# Patient Record
Sex: MALE | Race: WHITE | Employment: UNEMPLOYED | URBAN - METROPOLITAN AREA
[De-identification: names, ages, dates, MRNs, and addresses within clinical notes are randomized per-mention and may not be internally consistent; named-entity substitution may affect disease eponyms.]

---

## 2020-04-25 ENCOUNTER — HOSPITAL ENCOUNTER (EMERGENCY)
Age: 35
Discharge: ANOTHER HOSPITAL | End: 2020-04-26
Attending: EMERGENCY MEDICINE
Payer: MEDICAID

## 2020-04-25 ENCOUNTER — APPOINTMENT (OUTPATIENT)
Dept: GENERAL RADIOLOGY | Age: 35
End: 2020-04-25
Attending: EMERGENCY MEDICINE
Payer: MEDICAID

## 2020-04-25 ENCOUNTER — APPOINTMENT (OUTPATIENT)
Dept: CT IMAGING | Age: 35
End: 2020-04-25
Attending: EMERGENCY MEDICINE
Payer: MEDICAID

## 2020-04-25 DIAGNOSIS — K12.2 ANGINA, LUDWIG: Primary | ICD-10-CM

## 2020-04-25 LAB
ANION GAP SERPL CALC-SCNC: 9 MMOL/L (ref 3–18)
BASOPHILS # BLD: 0 K/UL (ref 0–0.1)
BASOPHILS NFR BLD: 0 % (ref 0–2)
BUN SERPL-MCNC: 13 MG/DL (ref 7–18)
BUN/CREAT SERPL: 9 (ref 12–20)
CALCIUM SERPL-MCNC: 9.8 MG/DL (ref 8.5–10.1)
CHLORIDE SERPL-SCNC: 101 MMOL/L (ref 100–111)
CO2 SERPL-SCNC: 25 MMOL/L (ref 21–32)
CREAT SERPL-MCNC: 1.4 MG/DL (ref 0.6–1.3)
DEPRECATED S PYO AG THROAT QL EIA: NEGATIVE
DIFFERENTIAL METHOD BLD: ABNORMAL
EOSINOPHIL # BLD: 0 K/UL (ref 0–0.4)
EOSINOPHIL NFR BLD: 0 % (ref 0–5)
ERYTHROCYTE [DISTWIDTH] IN BLOOD BY AUTOMATED COUNT: 12.8 % (ref 11.6–14.5)
GLUCOSE SERPL-MCNC: 194 MG/DL (ref 74–99)
HCT VFR BLD AUTO: 46.2 % (ref 36–48)
HGB BLD-MCNC: 15.6 G/DL (ref 13–16)
LACTATE SERPL-SCNC: 1.8 MMOL/L (ref 0.4–2)
LYMPHOCYTES # BLD: 0.9 K/UL (ref 0.9–3.6)
LYMPHOCYTES NFR BLD: 4 % (ref 21–52)
MCH RBC QN AUTO: 32.8 PG (ref 24–34)
MCHC RBC AUTO-ENTMCNC: 33.8 G/DL (ref 31–37)
MCV RBC AUTO: 97.3 FL (ref 74–97)
MONOCYTES # BLD: 1.4 K/UL (ref 0.05–1.2)
MONOCYTES NFR BLD: 6 % (ref 3–10)
NEUTS SEG # BLD: 20.4 K/UL (ref 1.8–8)
NEUTS SEG NFR BLD: 90 % (ref 40–73)
PLATELET # BLD AUTO: 279 K/UL (ref 135–420)
PMV BLD AUTO: 9.8 FL (ref 9.2–11.8)
POTASSIUM SERPL-SCNC: 3.3 MMOL/L (ref 3.5–5.5)
RBC # BLD AUTO: 4.75 M/UL (ref 4.7–5.5)
SODIUM SERPL-SCNC: 135 MMOL/L (ref 136–145)
WBC # BLD AUTO: 22.7 K/UL (ref 4.6–13.2)

## 2020-04-25 PROCEDURE — 83605 ASSAY OF LACTIC ACID: CPT

## 2020-04-25 PROCEDURE — 87070 CULTURE OTHR SPECIMN AEROBIC: CPT

## 2020-04-25 PROCEDURE — 87040 BLOOD CULTURE FOR BACTERIA: CPT

## 2020-04-25 PROCEDURE — 51702 INSERT TEMP BLADDER CATH: CPT

## 2020-04-25 PROCEDURE — 96375 TX/PRO/DX INJ NEW DRUG ADDON: CPT

## 2020-04-25 PROCEDURE — 74011250636 HC RX REV CODE- 250/636: Performed by: EMERGENCY MEDICINE

## 2020-04-25 PROCEDURE — 85025 COMPLETE CBC W/AUTO DIFF WBC: CPT

## 2020-04-25 PROCEDURE — 74011000258 HC RX REV CODE- 258: Performed by: EMERGENCY MEDICINE

## 2020-04-25 PROCEDURE — 80048 BASIC METABOLIC PNL TOTAL CA: CPT

## 2020-04-25 PROCEDURE — 70491 CT SOFT TISSUE NECK W/DYE: CPT

## 2020-04-25 PROCEDURE — 96361 HYDRATE IV INFUSION ADD-ON: CPT

## 2020-04-25 PROCEDURE — 74011000250 HC RX REV CODE- 250: Performed by: EMERGENCY MEDICINE

## 2020-04-25 PROCEDURE — 74011636320 HC RX REV CODE- 636/320: Performed by: EMERGENCY MEDICINE

## 2020-04-25 PROCEDURE — 99291 CRITICAL CARE FIRST HOUR: CPT

## 2020-04-25 PROCEDURE — 96365 THER/PROPH/DIAG IV INF INIT: CPT

## 2020-04-25 PROCEDURE — 99285 EMERGENCY DEPT VISIT HI MDM: CPT

## 2020-04-25 PROCEDURE — 74011250637 HC RX REV CODE- 250/637: Performed by: EMERGENCY MEDICINE

## 2020-04-25 PROCEDURE — 87880 STREP A ASSAY W/OPTIC: CPT

## 2020-04-25 PROCEDURE — 77030005529 HC CATH URETH FOL40 BARD -A

## 2020-04-25 RX ORDER — CLINDAMYCIN PHOSPHATE 600 MG/50ML
600 INJECTION INTRAVENOUS
Status: DISCONTINUED | OUTPATIENT
Start: 2020-04-25 | End: 2020-04-25 | Stop reason: RX

## 2020-04-25 RX ORDER — FENTANYL CITRATE 50 UG/ML
50 INJECTION, SOLUTION INTRAMUSCULAR; INTRAVENOUS ONCE
Status: COMPLETED | OUTPATIENT
Start: 2020-04-25 | End: 2020-04-25

## 2020-04-25 RX ORDER — DIPHENHYDRAMINE HYDROCHLORIDE 50 MG/ML
12.5 INJECTION, SOLUTION INTRAMUSCULAR; INTRAVENOUS ONCE
Status: COMPLETED | OUTPATIENT
Start: 2020-04-25 | End: 2020-04-25

## 2020-04-25 RX ORDER — MORPHINE SULFATE 4 MG/ML
4 INJECTION, SOLUTION INTRAMUSCULAR; INTRAVENOUS
Status: COMPLETED | OUTPATIENT
Start: 2020-04-25 | End: 2020-04-25

## 2020-04-25 RX ORDER — ONDANSETRON 4 MG/1
4 TABLET, ORALLY DISINTEGRATING ORAL
Status: COMPLETED | OUTPATIENT
Start: 2020-04-25 | End: 2020-04-25

## 2020-04-25 RX ADMIN — MORPHINE SULFATE 4 MG: 4 INJECTION, SOLUTION INTRAMUSCULAR; INTRAVENOUS at 23:26

## 2020-04-25 RX ADMIN — SODIUM CHLORIDE 600 MG: 900 INJECTION, SOLUTION INTRAVENOUS at 23:01

## 2020-04-25 RX ADMIN — DIPHENHYDRAMINE HYDROCHLORIDE 12.5 MG: 50 INJECTION, SOLUTION INTRAMUSCULAR; INTRAVENOUS at 22:18

## 2020-04-25 RX ADMIN — ONDANSETRON 4 MG: 4 TABLET, ORALLY DISINTEGRATING ORAL at 22:18

## 2020-04-25 RX ADMIN — METHYLPREDNISOLONE SODIUM SUCCINATE 125 MG: 125 INJECTION, POWDER, FOR SOLUTION INTRAMUSCULAR; INTRAVENOUS at 22:57

## 2020-04-25 RX ADMIN — FENTANYL CITRATE 50 MCG: 0.05 INJECTION, SOLUTION INTRAMUSCULAR; INTRAVENOUS at 22:58

## 2020-04-25 RX ADMIN — IOPAMIDOL 100 ML: 612 INJECTION, SOLUTION INTRAVENOUS at 22:03

## 2020-04-25 RX ADMIN — SODIUM CHLORIDE 1000 ML: 900 INJECTION, SOLUTION INTRAVENOUS at 22:56

## 2020-04-25 RX ADMIN — SODIUM CHLORIDE 1000 ML: 900 INJECTION, SOLUTION INTRAVENOUS at 21:18

## 2020-04-26 ENCOUNTER — ANESTHESIA EVENT (OUTPATIENT)
Dept: EMERGENCY DEPT | Age: 35
End: 2020-04-26
Payer: MEDICAID

## 2020-04-26 ENCOUNTER — ANESTHESIA (OUTPATIENT)
Dept: EMERGENCY DEPT | Age: 35
End: 2020-04-26
Payer: MEDICAID

## 2020-04-26 ENCOUNTER — APPOINTMENT (OUTPATIENT)
Dept: GENERAL RADIOLOGY | Age: 35
End: 2020-04-26
Attending: EMERGENCY MEDICINE
Payer: MEDICAID

## 2020-04-26 VITALS
OXYGEN SATURATION: 98 % | HEIGHT: 75 IN | HEART RATE: 123 BPM | SYSTOLIC BLOOD PRESSURE: 113 MMHG | DIASTOLIC BLOOD PRESSURE: 69 MMHG | TEMPERATURE: 100.9 F | BODY MASS INDEX: 23.25 KG/M2 | WEIGHT: 187 LBS | RESPIRATION RATE: 10 BRPM

## 2020-04-26 LAB
ARTERIAL PATENCY WRIST A: YES
BASE EXCESS BLD CALC-SCNC: 1 MMOL/L
BDY SITE: ABNORMAL
GAS FLOW.O2 O2 DELIVERY SYS: ABNORMAL L/MIN
GAS FLOW.O2 SETTING OXYMISER: 4 BPM
HCO3 BLD-SCNC: 27 MMOL/L (ref 22–26)
INSPIRATION.DURATION SETTING TIME VENT: 0.9 SEC
NITRIC:PPM ISTAT,INITR: 0 PPM
O2/TOTAL GAS SETTING VFR VENT: 100 %
PCO2 BLD: 54.7 MMHG (ref 35–45)
PEEP RESPIRATORY: 5 CMH2O
PH BLD: 7.3 [PH] (ref 7.35–7.45)
PO2 BLD: 439 MMHG (ref 80–100)
PRESSURE SUPPORT SETTING VENT: 10 CMH2O
SAO2 % BLD: 100 % (ref 92–97)
SERVICE CMNT-IMP: ABNORMAL
SPECIMEN TYPE: ABNORMAL
TOTAL RESP. RATE, ITRR: 19
VENTILATION MODE VENT: ABNORMAL
VT SETTING VENT: 500 ML

## 2020-04-26 PROCEDURE — 74011250636 HC RX REV CODE- 250/636: Performed by: NURSE ANESTHETIST, CERTIFIED REGISTERED

## 2020-04-26 PROCEDURE — 36600 WITHDRAWAL OF ARTERIAL BLOOD: CPT

## 2020-04-26 PROCEDURE — 74011000250 HC RX REV CODE- 250: Performed by: NURSE ANESTHETIST, CERTIFIED REGISTERED

## 2020-04-26 PROCEDURE — 82803 BLOOD GASES ANY COMBINATION: CPT

## 2020-04-26 PROCEDURE — 74011000250 HC RX REV CODE- 250: Performed by: EMERGENCY MEDICINE

## 2020-04-26 PROCEDURE — 96366 THER/PROPH/DIAG IV INF ADDON: CPT

## 2020-04-26 PROCEDURE — 74011250636 HC RX REV CODE- 250/636

## 2020-04-26 PROCEDURE — 31500 INSERT EMERGENCY AIRWAY: CPT

## 2020-04-26 PROCEDURE — 74011250636 HC RX REV CODE- 250/636: Performed by: ANESTHESIOLOGY

## 2020-04-26 PROCEDURE — 74011000250 HC RX REV CODE- 250

## 2020-04-26 PROCEDURE — 77030021678 HC GLIDESCP STAT DISP VERT -B: Performed by: NURSE ANESTHETIST, CERTIFIED REGISTERED

## 2020-04-26 PROCEDURE — 74011000258 HC RX REV CODE- 258: Performed by: EMERGENCY MEDICINE

## 2020-04-26 PROCEDURE — 96375 TX/PRO/DX INJ NEW DRUG ADDON: CPT

## 2020-04-26 PROCEDURE — 77030008477 HC STYL SATN SLP COVD -A: Performed by: NURSE ANESTHETIST, CERTIFIED REGISTERED

## 2020-04-26 PROCEDURE — 77030008684 HC TU ET CUF COVD -B: Performed by: NURSE ANESTHETIST, CERTIFIED REGISTERED

## 2020-04-26 PROCEDURE — 71045 X-RAY EXAM CHEST 1 VIEW: CPT

## 2020-04-26 PROCEDURE — 94002 VENT MGMT INPAT INIT DAY: CPT

## 2020-04-26 PROCEDURE — 96365 THER/PROPH/DIAG IV INF INIT: CPT

## 2020-04-26 PROCEDURE — 74011250636 HC RX REV CODE- 250/636: Performed by: EMERGENCY MEDICINE

## 2020-04-26 RX ORDER — HYDROMORPHONE HYDROCHLORIDE 2 MG/ML
2 INJECTION, SOLUTION INTRAMUSCULAR; INTRAVENOUS; SUBCUTANEOUS ONCE
Status: COMPLETED | OUTPATIENT
Start: 2020-04-26 | End: 2020-04-26

## 2020-04-26 RX ORDER — MIDAZOLAM HYDROCHLORIDE 1 MG/ML
INJECTION, SOLUTION INTRAMUSCULAR; INTRAVENOUS
Status: COMPLETED
Start: 2020-04-26 | End: 2020-04-26

## 2020-04-26 RX ORDER — PROPOFOL 10 MG/ML
INJECTION, EMULSION INTRAVENOUS AS NEEDED
Status: DISCONTINUED | OUTPATIENT
Start: 2020-04-26 | End: 2020-04-26 | Stop reason: HOSPADM

## 2020-04-26 RX ORDER — HYDROMORPHONE HYDROCHLORIDE 2 MG/ML
2 INJECTION, SOLUTION INTRAMUSCULAR; INTRAVENOUS; SUBCUTANEOUS
Status: DISCONTINUED | OUTPATIENT
Start: 2020-04-26 | End: 2020-04-26 | Stop reason: HOSPADM

## 2020-04-26 RX ORDER — SODIUM CHLORIDE 9 MG/ML
150 INJECTION, SOLUTION INTRAVENOUS CONTINUOUS
Status: DISCONTINUED | OUTPATIENT
Start: 2020-04-26 | End: 2020-04-26 | Stop reason: HOSPADM

## 2020-04-26 RX ORDER — CLINDAMYCIN PHOSPHATE 600 MG/50ML
600 INJECTION INTRAVENOUS EVERY 6 HOURS
Status: DISCONTINUED | OUTPATIENT
Start: 2020-04-26 | End: 2020-04-26 | Stop reason: HOSPADM

## 2020-04-26 RX ORDER — KETAMINE HCL 50MG/ML(1)
SYRINGE (ML) INTRAVENOUS
Status: COMPLETED
Start: 2020-04-26 | End: 2020-04-26

## 2020-04-26 RX ORDER — HYDROMORPHONE HYDROCHLORIDE 1 MG/ML
2 INJECTION, SOLUTION INTRAMUSCULAR; INTRAVENOUS; SUBCUTANEOUS
Status: DISCONTINUED | OUTPATIENT
Start: 2020-04-26 | End: 2020-04-26 | Stop reason: SDUPTHER

## 2020-04-26 RX ORDER — GLYCOPYRROLATE 0.2 MG/ML
INJECTION INTRAMUSCULAR; INTRAVENOUS AS NEEDED
Status: DISCONTINUED | OUTPATIENT
Start: 2020-04-26 | End: 2020-04-26 | Stop reason: HOSPADM

## 2020-04-26 RX ORDER — KETAMINE HCL 50MG/ML(1)
0.25 SYRINGE (ML) INTRAVENOUS ONCE
Status: COMPLETED | OUTPATIENT
Start: 2020-04-26 | End: 2020-04-26

## 2020-04-26 RX ORDER — PROPOFOL 10 MG/ML
0-50 VIAL (ML) INTRAVENOUS
Status: DISCONTINUED | OUTPATIENT
Start: 2020-04-26 | End: 2020-04-26 | Stop reason: HOSPADM

## 2020-04-26 RX ORDER — MIDAZOLAM HYDROCHLORIDE 1 MG/ML
5 INJECTION, SOLUTION INTRAMUSCULAR; INTRAVENOUS ONCE
Status: COMPLETED | OUTPATIENT
Start: 2020-04-26 | End: 2020-04-26

## 2020-04-26 RX ORDER — HYDROMORPHONE HYDROCHLORIDE 2 MG/ML
INJECTION, SOLUTION INTRAMUSCULAR; INTRAVENOUS; SUBCUTANEOUS
Status: DISCONTINUED
Start: 2020-04-26 | End: 2020-04-26 | Stop reason: HOSPADM

## 2020-04-26 RX ORDER — MORPHINE SULFATE 4 MG/ML
4 INJECTION, SOLUTION INTRAMUSCULAR; INTRAVENOUS
Status: COMPLETED | OUTPATIENT
Start: 2020-04-26 | End: 2020-04-26

## 2020-04-26 RX ORDER — MIDAZOLAM HYDROCHLORIDE 1 MG/ML
INJECTION, SOLUTION INTRAMUSCULAR; INTRAVENOUS AS NEEDED
Status: DISCONTINUED | OUTPATIENT
Start: 2020-04-26 | End: 2020-04-26 | Stop reason: HOSPADM

## 2020-04-26 RX ORDER — KETAMINE HCL 50MG/ML(1)
SYRINGE (ML) INTRAVENOUS AS NEEDED
Status: DISCONTINUED | OUTPATIENT
Start: 2020-04-26 | End: 2020-04-26 | Stop reason: HOSPADM

## 2020-04-26 RX ADMIN — MORPHINE SULFATE 4 MG: 4 INJECTION, SOLUTION INTRAMUSCULAR; INTRAVENOUS at 02:00

## 2020-04-26 RX ADMIN — PROPOFOL 50 MG: 10 INJECTION, EMULSION INTRAVENOUS at 06:03

## 2020-04-26 RX ADMIN — MIDAZOLAM HYDROCHLORIDE 5 MG: 1 INJECTION, SOLUTION INTRAMUSCULAR; INTRAVENOUS at 06:00

## 2020-04-26 RX ADMIN — METHYLPREDNISOLONE SODIUM SUCCINATE 125 MG: 125 INJECTION, POWDER, FOR SOLUTION INTRAMUSCULAR; INTRAVENOUS at 05:40

## 2020-04-26 RX ADMIN — GLYCOPYRROLATE 0.2 MG: 0.2 INJECTION INTRAMUSCULAR; INTRAVENOUS at 05:51

## 2020-04-26 RX ADMIN — PROPOFOL 50 MG: 10 INJECTION, EMULSION INTRAVENOUS at 06:02

## 2020-04-26 RX ADMIN — Medication 21 MG: at 06:02

## 2020-04-26 RX ADMIN — MIDAZOLAM 3 MG: 1 INJECTION INTRAMUSCULAR; INTRAVENOUS at 06:14

## 2020-04-26 RX ADMIN — Medication 50 MG: at 06:02

## 2020-04-26 RX ADMIN — MIDAZOLAM 3 MG: 1 INJECTION INTRAMUSCULAR; INTRAVENOUS at 06:00

## 2020-04-26 RX ADMIN — SODIUM CHLORIDE 150 ML/HR: 900 INJECTION, SOLUTION INTRAVENOUS at 05:41

## 2020-04-26 RX ADMIN — HYDROMORPHONE HYDROCHLORIDE 2 MG: 2 INJECTION INTRAMUSCULAR; INTRAVENOUS; SUBCUTANEOUS at 06:30

## 2020-04-26 RX ADMIN — SODIUM CHLORIDE 600 MG: 900 INJECTION, SOLUTION INTRAVENOUS at 05:50

## 2020-04-26 RX ADMIN — MIDAZOLAM 5 MG: 1 INJECTION INTRAMUSCULAR; INTRAVENOUS at 06:00

## 2020-04-26 RX ADMIN — PROPOFOL 50 MCG/KG/MIN: 10 INJECTION, EMULSION INTRAVENOUS at 05:43

## 2020-04-26 NOTE — ED NOTES
(Duration of care: 5 hours 15 minutes)Patient was moved to  at 0018 for intubation. CRNA here for intubation but does not believe patient needs to be intubated. CRNA spoke with her boss, Dr. Cee Castro who agrees. Patient is supposed to be transferred to [facility] but ENT will not accept patient unless patient is intubated. Numerous administrators paged, but only few returned calls back. Spoke with [facility] ENT who also declined to take patient unless patient was intubated and [facility] ENT who agreed that patient needed to be intubated but declined patient because he was not in their system even though is previous . Called Dr. Dru Esqueda again who did answer and spoke with Dr. Katty Carlos. Called anesthesia (Dr. Cee Castro @ [phone]) who agrees to come in and intubate patient. Dr. Cee Castro here @ [phone].

## 2020-04-26 NOTE — ANESTHESIA PROCEDURE NOTES
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 Rosie Canchola, Dr. Lelo Toney on standby in room with Trach tray, Respiratory Therapy with Ventilator set up, Fiberoptic Bronchoscope prepped and in room for assistance    Medications given were: midazolam 3 mg, Ketamine 50 mg, Propofol 100mg  Direct Laryngoscopy with Glidescope MAC 3 blade, 1st and only attempt . Grade 2 view. Swelling noted with distortion of structures, visualized insertion through open cords, snug fit subglotticly with 7.0 ett  A number: 7.0 cuffed   ETT was placed to: 25 cm at the lips. Placement was evaluated by noting: bilateral, symmetric breath sounds, End tidal CO2 detector with color change  Attempts required: 1. Complications: none. The procedure was tolerated well. Disposition: ER - intubated and hemodynamically stable.         Plan to transfer to ENT service at [facility] Dr. Anu Terry    Signed By: Carlos Eduardo Griffith CRNA

## 2020-04-26 NOTE — ANESTHESIA PREPROCEDURE EVALUATION
Relevant Problems   No relevant active problems       Anesthetic History   No history of anesthetic complications            Review of Systems / Medical History  Patient summary reviewed, nursing notes reviewed and pertinent labs reviewed    Pulmonary  Within defined limits                 Neuro/Psych             Comments:   + remote hx/o CHI (2010) while in the [employer], states associated with one seizure, no recurrences, sxs resolved with no long term issues. Cardiovascular  Within defined limits                     GI/Hepatic/Renal  Within defined limits              Endo/Other  Within defined limits           Other Findings              Physical Exam    Airway  Mallampati: IV  TM Distance: 4 - 6 cm  Neck ROM: normal range of motion   Mouth opening: Diminished (comment)    Comments: Patient able to phonate and speak easily, not drooling; no stridor present on exam; + significant trismus, MO only about 2 FBs due to pain per patient. Cardiovascular    Rhythm: regular  Rate: abnormal        Comments: + tachycardia Dental    Dentition: Poor dentition     Pulmonary  Breath sounds clear to auscultation               Abdominal  GI exam deferred       Other Findings            Anesthetic Plan    ASA: 3, emergent            Induction: Intravenous  Anesthetic plan and risks discussed with: Patient      Plan tracheal intubation to protect airway due to Oswald's Angina; plan to maintain patient's ability to spontaneously ventilate; will use Video Larygoscopy/Glidescope, possible Fiberoptic oral vs. Nasal intubation if Glidescope not successful.

## 2020-04-26 NOTE — ED TRIAGE NOTES
Ambulatory to triage. C/o severe sore throat with difficulty swallowing, able to tolerate liquids, unable to open his mouth wide open x3 days. Also reports subjective fever, chills, dry cough, and body aches that began today.

## 2020-04-26 NOTE — ED NOTES
Patient report given to transport team taking patient to [facility] Emergency Department. Respiratory therapist reviewed patient's ventilator settings with transport team. Patient sedated and VSS upon leaving ER.

## 2020-04-26 NOTE — ED NOTES
Updated report called to Lashawn Dejesus at [facility] that pt is still currently in this ED, not intubated and POC is still not decided between anesthesia/EENT. Updated report will be called to [facility] when care has progressed. Pt still tolerating secretions well, able to swallow but with pain, 100% RA, speaking in full sentences.

## 2020-04-26 NOTE — ED PROVIDER NOTES
EMERGENCY DEPARTMENT HISTORY AND PHYSICAL EXAM    Date: 4/25/2020  Patient Name: Shankar Hicks    History of Presenting Illness     Chief Complaint   Patient presents with    Sore Throat       History Provided By: Patient    Additional History (Context):   9:30 PM  Shankar Hicks is a 27 y.o. male with PMHX of previous good health who presents to the emergency department C/O sore throat and aphasia. Patient initially had some dental-like pain or discomfort on the right-hand side however the symptoms subsequently resolved. Later he still feels achy and then began to have insidious onset sore throat painful swallowing involving every 3 to 4-day. Today the odynophagia prevented him from being able to eat and drink prompting the ER visit. He denies any subjective fevers or chills though he does arrive with low-grade fever. He denies any cough or shortness of breath. He has no nausea vomiting or diarrhea. He has no ill contacts. He has no travel or suspected coronavirus exposures. Social History  Denies smoking drinking or drugs    Family History  No family history of slow healing appropriately. PCP: None        Past History     Past Medical History:  Past Medical History:   Diagnosis Date    Brain injury [facility])        Past Surgical History:  History reviewed. No pertinent surgical history. Family History:  History reviewed. No pertinent family history. Social History:  Social History     Tobacco Use    Smoking status: Former Smoker   Substance Use Topics    Alcohol use: Not Currently    Drug use: Not on file       Allergies: Allergies   Allergen Reactions    Pcn [Penicillins] Anaphylaxis         Review of Systems   Review of Systems   Constitutional: Positive for activity change and appetite change. Negative for chills, fatigue, fever and unexpected weight change. HENT: Positive for sore throat, trouble swallowing and voice change.     Respiratory: Negative for apnea, cough, choking, chest tightness and shortness of breath. Cardiovascular: Negative for chest pain and leg swelling. Gastrointestinal: Negative for abdominal pain, nausea and vomiting. Endocrine: Negative for cold intolerance, heat intolerance, polydipsia and polyphagia. Genitourinary: Negative for dysuria and flank pain. Musculoskeletal: Negative for back pain and myalgias. Skin: Negative for color change, rash and wound. Neurological: Negative for seizures and facial asymmetry. Hematological: Positive for adenopathy. Psychiatric/Behavioral: Negative for behavioral problems. The patient is not nervous/anxious. Physical Exam     Vitals:    04/26/20 0545 04/26/20 0600 04/26/20 0615 04/26/20 0620   BP: (!) 135/92 (!) 145/94 (!) 154/107    Pulse: (!) 132 (!) 136 (!) 140 (!) 136   Resp: 20 19 19 20   Temp:       SpO2: 97% 100% 99% 99%   Weight:       Height:         Physical Exam  Vitals signs and nursing note reviewed. Constitutional:       General: He is not in acute distress. Appearance: He is well-developed. He is not diaphoretic. HENT:      Head: Normocephalic and atraumatic. Right Ear: Tympanic membrane and external ear normal.      Left Ear: Tympanic membrane and external ear normal.      Mouth/Throat:      Mouth: Mucous membranes are moist.      Dentition: Dental caries present. Pharynx: Uvula midline. Pharyngeal swelling, posterior oropharyngeal erythema and uvula swelling present. No oropharyngeal exudate. Comments: Malampatti score between 2 and 3  Eyes:      General: No scleral icterus. Conjunctiva/sclera: Conjunctivae normal.      Pupils: Pupils are equal, round, and reactive to light. Comments: No pallor   Neck:      Musculoskeletal: Normal range of motion and neck supple. Thyroid: No thyromegaly. Vascular: No JVD. Trachea: No tracheal deviation.       Comments: Mild erythema and edema noted to the lateral aspect of the anterior cervical triangle there is also redness and tenderness exquisitely to the floor the mouth under the submental region  Voice is hoarse  Cardiovascular:      Rate and Rhythm: Regular rhythm. Tachycardia present. Heart sounds: Normal heart sounds. Pulmonary:      Effort: Pulmonary effort is normal. No respiratory distress. Breath sounds: Normal breath sounds. No stridor. Abdominal:      General: Bowel sounds are normal. There is no distension. Palpations: Abdomen is soft. Tenderness: There is no abdominal tenderness. There is no guarding or rebound. Musculoskeletal: Normal range of motion. General: No tenderness. Comments: No soft tissue injuries   Lymphadenopathy:      Cervical: No cervical adenopathy. Skin:     General: Skin is warm and dry. Findings: No erythema or rash. Neurological:      Mental Status: He is alert and oriented to person, place, and time. Cranial Nerves: No cranial nerve deficit. Coordination: Coordination normal.      Deep Tendon Reflexes: Reflexes are normal and symmetric. Reflexes normal.   Psychiatric:         Attention and Perception: Attention normal.         Mood and Affect: Mood and affect normal.         Speech: He is communicative. Speech is not rapid and pressured, delayed, slurred or tangential.         Behavior: Behavior normal.         Thought Content:  Thought content normal.         Judgment: Judgment normal.           Diagnostic Study Results     Labs -     Recent Results (from the past 12 hour(s))   STREP AG SCREEN, GROUP A    Collection Time: 04/25/20  9:00 PM   Result Value Ref Range    Group A Strep Ag ID Negative     CBC WITH AUTOMATED DIFF    Collection Time: 04/25/20  9:00 PM   Result Value Ref Range    WBC 22.7 (H) 4.6 - 13.2 K/uL    RBC 4.75 4.70 - 5.50 M/uL    HGB 15.6 13.0 - 16.0 g/dL    HCT 46.2 36.0 - 48.0 %    MCV 97.3 (H) 74.0 - 97.0 FL    MCH 32.8 24.0 - 34.0 PG    MCHC 33.8 31.0 - 37.0 g/dL    RDW 12.8 11.6 - 14.5 %    PLATELET 550 666 - 420 K/uL    MPV 9.8 9.2 - 11.8 FL    NEUTROPHILS 90 (H) 40 - 73 %    LYMPHOCYTES 4 (L) 21 - 52 %    MONOCYTES 6 3 - 10 %    EOSINOPHILS 0 0 - 5 %    BASOPHILS 0 0 - 2 %    ABS. NEUTROPHILS 20.4 (H) 1.8 - 8.0 K/UL    ABS. LYMPHOCYTES 0.9 0.9 - 3.6 K/UL    ABS. MONOCYTES 1.4 (H) 0.05 - 1.2 K/UL    ABS. EOSINOPHILS 0.0 0.0 - 0.4 K/UL    ABS. BASOPHILS 0.0 0.0 - 0.1 K/UL    DF AUTOMATED     METABOLIC PANEL, BASIC    Collection Time: 04/25/20  9:00 PM   Result Value Ref Range    Sodium 135 (L) 136 - 145 mmol/L    Potassium 3.3 (L) 3.5 - 5.5 mmol/L    Chloride 101 100 - 111 mmol/L    CO2 25 21 - 32 mmol/L    Anion gap 9 3.0 - 18 mmol/L    Glucose 194 (H) 74 - 99 mg/dL    BUN 13 7.0 - 18 MG/DL    Creatinine 1.40 (H) 0.6 - 1.3 MG/DL    BUN/Creatinine ratio 9 (L) 12 - 20      GFR est AA >60 >60 ml/min/1.73m2    GFR est non-AA 58 (L) >60 ml/min/1.73m2    Calcium 9.8 8.5 - 10.1 MG/DL   LACTIC ACID    Collection Time: 04/25/20  9:00 PM   Result Value Ref Range    Lactic acid 1.8 0.4 - 2.0 MMOL/L   POC G3    Collection Time: 04/26/20  6:15 AM   Result Value Ref Range    Device: VENT      FIO2 (POC) 100 %    pH (POC) 7.301 (L) 7.35 - 7.45      pCO2 (POC) 54.7 (H) 35.0 - 45.0 MMHG    pO2 (POC) 439 (H) 80 - 100 MMHG    HCO3 (POC) 27.0 (H) 22 - 26 MMOL/L    sO2 (POC) 100 (H) 92 - 97 %    Base excess (POC) 1 mmol/L    Mode SIMV      Tidal volume 500 ml    Set Rate 4 bpm    PEEP/CPAP (POC) 5 cmH2O    Pressure support 10 cmH2O    Allens test (POC) YES      Inspiratory Time 0.90 sec    Nitric-ppm (POC) 0 ppm    Total resp.  rate 19      Site LEFT RADIAL      Specimen type (POC) ARTERIAL      Performed by Marely Payne        Radiologic Studies -   CT NECK SOFT TISSUE W CONT    (Results Pending)   XR CHEST PORT    (Results Pending)     Findings suggest advanced floor of mouth cellulitis, worse on the right, with associated moderate interstitial gas, pharyngeal and retropharyngeal edema extending down to the level of the thyroid cartilage. Additional severe narrowing of the airway at the level of the vocal cords. This is secondary to edema, but a component of expiratory phase imaging could also contribute.   Right-sided cervical adenopathy  Right mandibular dental caries  Fat planes in the superior mediastinum are preserved without evidence of involvement currently  Findings suggest Oswald's angina    Final attending radiology report pending  Carrie Menon MD      CT Results  (Last 48 hours)    None        CXR Results  (Last 48 hours)    None          Medications given in the ED-  Medications   0.9% sodium chloride infusion ( IntraVENous Rate Change 4/26/20 0551)   clindamycin (CLEOCIN) 600mg D5W 50mL IVPB (premix) (has no administration in time range)   propofol (DIPRIVAN) 10 mg/mL infusion (60 mcg/kg/min × 84.8 kg IntraVENous Rate Change 4/26/20 0628)   HYDROmorphone (DILAUDID) injection 2 mg (has no administration in time range)   HYDROmorphone (DILAUDID) injection 2 mg (has no administration in time range)   sodium chloride 0.9 % bolus infusion 1,000 mL (0 mL IntraVENous IV Completed 4/26/20 0201)   fentaNYL citrate (PF) injection 50 mcg (50 mcg IntraVENous Given 4/25/20 2258)   ondansetron (ZOFRAN ODT) tablet 4 mg (4 mg Oral Given 4/25/20 2218)   diphenhydrAMINE (BENADRYL) injection 12.5 mg (12.5 mg IntraVENous Given 4/25/20 2218)   methylPREDNISolone (PF) (Solu-MEDROL) injection 125 mg (125 mg IntraVENous Given 4/25/20 2257)   sodium chloride 0.9 % bolus infusion 1,000 mL (0 mL IntraVENous IV Completed 4/25/20 2356)   iopamidoL (ISOVUE 300) 61 % contrast injection 100 mL (100 mL IntraVENous Given 4/25/20 2203)   clindamycin phosphate (CLEOCIN) 600 mg in 0.9% sodium chloride 100 mL IVPB (0 mg IntraVENous IV Completed 4/25/20 2331)   morphine injection 4 mg (4 mg IntraVENous Given 4/25/20 2326)   morphine injection 4 mg (4 mg IntraVENous Given 4/26/20 0200)   midazolam (VERSED) injection 5 mg (5 mg IntraVENous Given by Provider 4/26/20 0600)   ketamine (KETALAR) 50 mg/mL (1 mL) injection 21 mg (21 mg IntraVENous Given by Provider 4/26/20 0602)   methylPREDNISolone (PF) (Solu-MEDROL) injection 125 mg (125 mg IntraVENous Given 4/26/20 0540)   clindamycin phosphate (CLEOCIN) 600 mg in 0.9% sodium chloride 50 mL IVPB (0 mg IntraVENous IV Completed 4/26/20 0610)   HYDROmorphone (PF) (DILAUDID) injection 2 mg (2 mg IntraVENous Given 4/26/20 0630)         Medical Decision Making   I am the first provider for this patient. I reviewed the vital signs, available nursing notes, past medical history, past surgical history, family history and social history. Vital Signs-Reviewed the patient's vital signs. Pulse Oximetry Analysis -99 % on room air    Cardiac Monitor:  Rate: 146 bpm  Rhythm: Sinus tach    Records Reviewed: NURSING NOTES AND PREVIOUS MEDICAL RECORDS    Provider Notes (Medical Decision Making):   Patient with sore throat and odynophagia strongly suggestive of dehydration or impending surgical case either peritonsillar abscess, retropharyngeal abscess, Oswald's angina. And of the neck mass including cancer is also possible but unlikely with such acute onset symptoms. We have suggested extensively about coronavirus [place] 70 appears he does not follow with an eye category. Procedures:  Procedures    ED Course:   9:30 PM: Initial assessment performed. The patients presenting problems have been discussed, and they are in agreement with the care plan formulated and outlined with them. I have encouraged them to ask questions as they arise throughout their visit.     11:26 PM  Consult was made to [facility] to speak with both ED attending as well as otolaryngologist on call    11:30 PM  Case was reviewed with Dr. Koki Coyle excepting of the ED attending    11:45 PM  Dr. Carmina Estes, have [facility] ENT program call back to inform he would not accept the patient in transfer unless the patient was intubated prior to transfer. 12:30 PM  Patient was seen in the emergency department by [facility] CRNA for potential intubation. Her specifics comments were that the patient is awake alert and oriented with essentially no hoarse voice as his condition has improved. He is not drooling and appears to be in no distress. She passed on to call her attending Dr. Reggie Koyanagi to discuss whether intubation was urgently needed prior to transfer. I spoke with Dr. Reggie Koyanagi directly to [facility] cell phone and her specific thoughts were the patient did not need to be intubated prior to transfer. We specifically brought up the fact that patient required intubation for transfer however anesthesiology thought that that was not clinically appropriate. She brought up other clinical feasible options like having administrators from [facility] speak to administrators at [facility] as I agreed that my initial clinical suspicion was that he did not require emergent intubation however this would likely be required in order to ensure the stability of patient for transport. Despite her repeated conversation she reiterate that this did not make clinical sense intubation may in fact exacerbate patient's clinical situation. I did relate the fact that there was no ear nose throat surgical backup for this procedure. I did ask Dr. Aneta Claudio to come see evaluate the patient in order to establish documentation that intubation was not required at this time however she retorted the [facility] the CRNA was more than qualified to provide her clinical opinion as to the patient's condition and that would be appropriate. I specific feedback to her was that the only way the patient could be transferred was through activation were having her documentation on the chart but again she retorted that Ms. Landaverde's documentation would be appropriate but that I was free to call her back with any further questions.     Late entry  Estimated time 12:50 AM  Pages to hospital administrators were made specifically Dr. Omar Shah director of emergency medicine at the [facility] in addition to nursing supervisor, Mary Ann tena, you may be efforts to change hospital administrators including Dr. Milagros Cabello the [address] of the [address]    1:30 AM  I called and spoke with the ER attending at [facility] discuss her thoughts and opinions on the case. It was at this point we thought that contacting other hospitals would be appropriate if we could not engage anesthesia to perform the procedure. Subsequently called Dr. EDWARD [facility] at [facility] confirmed we do have otolaryngology available and her sister hospital specifically Dr. Juliana Huggins    3:12 AM  Case was reviewed with Dr. Kenyatta Medina ear nose and throat surgeon from [facility] after extensive review of the patient's record as well as questioning eligibility the patient for transfer they stated they be willing to take the patient however #1 agreed with intubation prior to transport as well as #2 no documentation could be showing confirming the patient was eligible for care [facility]    3:52 AM  Case was reviewed with Dr. Juliana Huggins, otolaryngologist at the [facility], he also agreed the patient would be appropriate for transfer there however 2 items #1 there were no ICU beds available in #2 he would make efforts to contact the heads of anesthesiology determined to help facilitate intubation on this patient    Late entry  Estimated 2:30 AM  Dr. Yousif Deleon, medical director at [facility] was called to see if she can gain access to personal phone numbers for Dr. Alice Cabello or other administrative heads at the hospital to help facilitate intubation of this patient.     4:40 AM  Dr. Pilar Montes De Oca was again called directly using Saima's personal cell phone to discuss the barriers in the patient treatment essentially requesting her to come down to perform the intubation whether she thought it was appropriate or not as it was the only way to definitively cared for the patient. When the discussion began to escalate about clinical judgment I told her I did not wish to document in the chart that she had repeatedly refused to come see the patient which she stated \"Oh no, it is not what I have been saying. We should do what needs to be done. \"    4:42 AM  Dr. Sydney Hargrove emergency room medical director [facility]. Case was reviewed with her and we reassured her that we were finally able to obtain the anesthesia intervention that was required for the patient. 4:45 AM  Flora Hernandez CRNA again stated concern that intubation would be done without surgical backup. I again repeated that I would end up being the surgical backup and would have the patient prepped and full cardiac surgical set at the bedside. 5:45 AM  Patient was successfully intubated by Flora Hernandez CRNA with Dr. Reggie Koyanagi at the bedside. This physician was personally present, dressed and prepped for emergent cricothyroidotomy. The surgical tray was cracked open surgical equipment as laid out and prepped and ready at the go. This equipment was not necessary as the patient was intubated with a steel circular reinforced 7 oh ET tube. There was resistance in elevation after the vocal cords. 6:10 AM  Case was again reviewed with Dr. Naz Sam as well as Dr. Adelita Caruso at [facility].  Patient will be transferred to the emergency department for definitive care. .  Diagnosis and Disposition     TRANSFER PROGRESS NOTE:  6:51 AM  Discussed impending transfer with patient. Patient was instructed that [facility] does not have otolaryngology surgical services and that patient would be transferred to [facility].  Patient understands and agrees with care plan.     Tiago Barlow MD     CRITICAL CARE NOTE:  7:35 AM  I have spent  120 minutes of critical care time involved in lab review, consultations with specialist, family decision-making, and documentation. During this entire length of time I was immediately available to the patient. Critical Care: The reason for providing this level of medical care for this critically ill patient was due a critical illness that impaired one or more vital organ systems such that there was a high probability of imminent or life threatening deterioration in the patients condition. This care involved high complexity decision making to assess, manipulate, and support vital system functions, to treat this degreee vital organ system failure and to prevent further life threatening deterioration of the patients condition. CLINICAL IMPRESSION:    1. Angina, Oswald        PLAN:  1. Transfer to [facility] for surgical evaluation  2. Continue IV fluids and sedation during intubation. 3.  Second dose of IV antibiotics as well as IV steroids were delivered prior to intubation. _______________________________    This note was partially transcribed via voice recognition software. Although efforts have been made to catch any discrepancies, it may contain sound alike words, grammatical errors, or nonsensical words.

## 2020-04-26 NOTE — ED NOTES
Pt moved to ED14 to prepare for intubation as requested per accepted EENT.    0122 anesthesia at bedside stating intubation not recommended for pt at this time, ER MD at bedside and aware

## 2020-04-26 NOTE — PROGRESS NOTES
Paged to the ER at [phone]. 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 Kiana Higgins requesting evaluation for Endotracheal tube for transport to ENT at [facility]. Patient awake and alert, sitting up, clear speaking voice, RR=16, 98% on Room air, 128/100, , able to handle secretions, states he spits occasionally and reports trouble swallowing tylenol pill today after 3 days of jaw pain. Skin warm to touch, Temp 100.2F,  has jaw stiffness and small mouth opening due to pain, [place]ampatti 4. No stridor noted, phonates clearly, patient reports mild hoarseness in his voice. Has already received Solumedrol, Morphine, Zofran, Clindamycin. Currently does not require Endotracheal intubation. Per Dr. Kiana Higgins, [facility] ER physician is willing to accept patient for care, but ENT Surgeon at [facility], Dr. Donavan Adams will not accept patient unless he is intubated prior to transfer. This invasive procedure is not indicated at this time. Dr. Burton Noyola, Anesthesia for [facility] consulted and agrees that patient does not meet clinical criteria for intubation. She has spoken with Dr. Kiana Higgins over the phone.      Robyn Farris CRNA

## 2020-04-26 NOTE — PROGRESS NOTES
Called for intubation of patient with throat swelling. Patient intubated with 7.0, 25@ lip. No issues with procedure. Anesthesia and ER doc at bedside. VS WNL, although patient was quite tachycardic. ABG done and patient resting awaiting transfer.

## 2020-04-26 NOTE — ANESTHESIA POSTPROCEDURE EVALUATION
* No procedures listed *. No value filed. Anesthesia Post Evaluation      Multimodal analgesia: multimodal analgesia used between 6 hours prior to anesthesia start to PACU discharge  Patient location during evaluation: ED  Patient participation: complete - patient cannot participate  Level of consciousness: responsive to painful stimuli  Airway patency: patent  Anesthetic complications: no  Cardiovascular status: acceptable  Respiratory status: acceptable, intubated, spontaneous ventilation, ventilator and ETT  Hydration status: acceptable  Comments: Patient tolerated tracheal intubation for Oswald's Angina via use of Glidescope with sedation (midazolam, ketamine, and propofol) quite well; very somnolent now, but grimaces with pain sitimuli; remains on a Propofol infusion, ventilator mode currently pressure support as patient is spontaneously breathing. Patient to be transferred to [facility] to ENT service.   Post anesthesia nausea and vomiting:  none      Vitals Value Taken Time   BP     Temp     Pulse 136 4/26/2020  6:20 AM   Resp 20 4/26/2020  6:20 AM   SpO2 99 % 4/26/2020  6:20 AM

## 2020-04-28 LAB
BACTERIA SPEC CULT: NORMAL
SERVICE CMNT-IMP: NORMAL

## 2020-05-01 LAB
BACTERIA SPEC CULT: NORMAL
BACTERIA SPEC CULT: NORMAL
SERVICE CMNT-IMP: NORMAL
SERVICE CMNT-IMP: NORMAL